# Patient Record
Sex: MALE | Race: OTHER
[De-identification: names, ages, dates, MRNs, and addresses within clinical notes are randomized per-mention and may not be internally consistent; named-entity substitution may affect disease eponyms.]

---
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
## 2022-05-06 ENCOUNTER — HOSPITAL ENCOUNTER (EMERGENCY)
Dept: HOSPITAL 15 - ER | Age: 22
LOS: 2 days | Discharge: HOME | End: 2022-05-08
Payer: COMMERCIAL

## 2022-05-06 VITALS — HEIGHT: 75 IN | BODY MASS INDEX: 28.1 KG/M2 | WEIGHT: 226 LBS
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
## 2022-05-06 DIAGNOSIS — S63.501A: Primary | ICD-10-CM
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
## 2022-05-07 VITALS — DIASTOLIC BLOOD PRESSURE: 93 MMHG | SYSTOLIC BLOOD PRESSURE: 147 MMHG

## 2025-03-11 ENCOUNTER — HOSPITAL ENCOUNTER (EMERGENCY)
Dept: HOSPITAL 15 - ER | Age: 25
Discharge: HOME | End: 2025-03-11
Payer: MEDICAID

## 2025-03-11 VITALS — WEIGHT: 270.07 LBS | HEIGHT: 74 IN | BODY MASS INDEX: 34.66 KG/M2

## 2025-03-11 VITALS
TEMPERATURE: 98.5 F | RESPIRATION RATE: 20 BRPM | HEART RATE: 100 BPM | DIASTOLIC BLOOD PRESSURE: 89 MMHG | OXYGEN SATURATION: 95 % | SYSTOLIC BLOOD PRESSURE: 156 MMHG

## 2025-03-11 DIAGNOSIS — M79.672: ICD-10-CM

## 2025-03-11 DIAGNOSIS — T63.621A: Primary | ICD-10-CM
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
## 2025-03-11 NOTE — ED.PDOC
Musculoskeletal


HPI Comments


Patient presents with a chief complaint of left foot pain to the left great toe 

after he was bit by a jellyfish yesterday at [place].


Chief Complaint:  Lower Extremity


Time Seen by MD:  15:21


Primary Care Provider:  UNKNOWN


Reviewed Notes:  Nurses Notes, Medications, Allergies


Allergies:  


Coded Allergies:  


     NO KNOWN ALLERGIES (Unverified , 3/5/14)


Home Meds


Active Scripts


Ibuprofen Micronized (Ibuprofen) 800 Mg Tab, 800 MG PO TID for 10 Days, #30 TAB 

0 Refills


   Prov:JACI BELL NP         3/11/25


Acetaminophen (Acetaminophen) 500 Mg Tab, 500 MG PO Q6HP PRN for 10 Days, #40 

TAB 0 Refills


   Prov:JACI BELL NP         3/11/25


Ibuprofen (Ibuprofen) 800 Mg Tab, 800 MG PO TID, #30 MG 0 Refills


   Prov:QI MILLER MD         5/7/22


Information Source:  Patient


Mode of Arrival:  Ambulatory





Past Medical History


PAST MEDICAL HISTORY:  Denies


Surgical History:  Denies all surgeries





Family History


Family History:  Unknown





Social History


Smoker:  Non-Smoker


Alcohol:  Denies ETOH Use


Drugs:  Marijuana


Lives In:  Home





All Other Systems:  Reviewed and Negative (per hpi)





Physical Exam


General Appearance:  No Apparent Distress, Normal


HEENT:  Normal ENT Inspection, Pharynx Normal, TMs Normal


Neck:  Full Range of Motion, Non-Tender, Normal, Normal Inspection


Respiratory:  Chest Non-Tender, Lungs Clear, No Accessory Muscle Use, No 

Respiratory Distress, Normal Breath Sounds


Cardiovascular:  No Edema, No JVD, No Murmur, No Gallop, Normal Peripheral Pu

lses, Regular Rate/Rhythm


Breast Exam:  Deferred


Gastrointestinal:  No Organomegaly, Non Tender, No Pulsatile Mass, Normal Bowel 

Sounds, Soft


Genitalia:  Deferred


Pelvic:  Deferred


Rectal:  Deferred


Extremities:  No calf tenderness, Normal capillary refill, Normal inspection, 

Normal range of motion, Non-tender, No pedal edema


Musculoskeletal :  


   Apperance:  Normal


Neurologic:  Alert, CNs II-XII nml as Tested, No Motor Deficits, Normal Affect, 

Normal Mood, No Sensory Deficits


Cerebellar Function:  Normal


Reflexes:  Normal


Skin:  Dry, Normal Color, Warm


Lymphatic:  No Adenopathy





Was a procedure done?


Was a procedure done?:  No





Differential Diagnosis EXT


Differential Diagnosis:  Other





X-Ray, Labs, Meds, VS





                                   Vital Signs








  Date Time  Temp Pulse Resp B/P (MAP) Pulse Ox O2 Delivery O2 Flow Rate FiO2


 


3/11/25 16:24  100 20  95 Room Air  


 


3/11/25 16:24 98.5 100 20 156/89 (111) 95   





 98.5       


 


3/11/25 15:04 98.5 100 20 156/89 (111) 95   








X-Ray, Labs, Meds, VS Comment


No red flags


On reevaluation, patient had symptomatic improvement. 


Patient is stable for discharge at this time.





External notes reviewed.


Test results and diagnostic imaging interpreted.


All diagnostic findings, discharge care, education and instructions provided


Follow-up with PCP in 2 to 3 days


Patient verbalized understanding and agreed to treatment plan


Vital signs stable, afebrile, no acute distress noted


Patient ambulatory with strong steady gait


Advised to return precautions for any new or worsening symptoms, return to ER 

immediately for re-evaluation





Patient is aware that the purpose of this visit was for an acute medical 

emergency requiring emergent stabilization.  Chronic conditions, including 

malignancies have not been ruled out.  Patient is instructed to follow up with 

PCP as directed and discharge instructions for continued care and workup. If 

unable to arrange follow-up, patient is to return to the emergency department 

for reassessment.  Patient (parent or legal guardian if applicable) was given 

verbal and written discharge instructions and acknowledges understanding.


Time of 1ST Reevaluation:  16:45


Reevaluation 1ST:  Improved


Patient Education/Counseling:  Diagnosis, Treatment


Family Education/Counseling:  Diagnosis, Treatment





Departure 1


Departure


Time of Disposition:  16:48


Impression:  


   Primary Impression:  


   Jellyfish sting


   Qualified Codes:  T63.621A - Toxic effect of contact with other jellyfish, 

   accidental (unintentional), initial encounter


Disposition:  01 HOME / SELF CARE / HOMELESS


Condition:  Fair


e-Prescriptions


Ibuprofen Micronized (Ibuprofen) 800 Mg Tab


800 MG PO TID for 10 Days, #30 TAB 0 Refills


   Prov: JACI BELL NP         3/11/25 


Acetaminophen (Acetaminophen) 500 Mg Tab


500 MG PO Q6HP PRN for 10 Days, #40 TAB 0 Refills


   Prov: JACI BELL NP         3/11/25





Critical Care Note


Critical Care Time?:  No





Stability


Stability form required:  No





Heart Score


Heart Score:  








Heart Score Response (Comments) Value


 


History N/A 0


 


EKG N/A 0


 


Age N/A 0


 


Risk Factors N/A 0


 


Troponin N/A 0


 


Total  0

















JACI BELL NP                Mar 11, 2025 16:49